# Patient Record
Sex: MALE | Race: WHITE | NOT HISPANIC OR LATINO | Employment: OTHER | URBAN - METROPOLITAN AREA
[De-identification: names, ages, dates, MRNs, and addresses within clinical notes are randomized per-mention and may not be internally consistent; named-entity substitution may affect disease eponyms.]

---

## 2022-11-28 ENCOUNTER — TELEPHONE (OUTPATIENT)
Dept: NEUROLOGY | Facility: CLINIC | Age: 77
End: 2022-11-28
Payer: MEDICARE

## 2022-12-01 ENCOUNTER — OFFICE VISIT (OUTPATIENT)
Dept: NEUROLOGY | Facility: CLINIC | Age: 77
End: 2022-12-01
Payer: MEDICARE

## 2022-12-01 ENCOUNTER — TELEPHONE (OUTPATIENT)
Dept: NEUROLOGY | Facility: CLINIC | Age: 77
End: 2022-12-01
Payer: MEDICARE

## 2022-12-01 VITALS
HEART RATE: 55 BPM | WEIGHT: 171.75 LBS | DIASTOLIC BLOOD PRESSURE: 91 MMHG | SYSTOLIC BLOOD PRESSURE: 196 MMHG | RESPIRATION RATE: 18 BRPM

## 2022-12-01 DIAGNOSIS — Z86.73 HISTORY OF STROKE: ICD-10-CM

## 2022-12-01 DIAGNOSIS — E23.6 PITUITARY MASS: ICD-10-CM

## 2022-12-01 DIAGNOSIS — G56.03 BILATERAL CARPAL TUNNEL SYNDROME: ICD-10-CM

## 2022-12-01 DIAGNOSIS — G56.03 CARPAL TUNNEL SYNDROME, BILATERAL: Primary | ICD-10-CM

## 2022-12-01 DIAGNOSIS — I10 HYPERTENSION, UNSPECIFIED TYPE: ICD-10-CM

## 2022-12-01 DIAGNOSIS — E11.69 TYPE 2 DIABETES MELLITUS WITH OTHER SPECIFIED COMPLICATION, WITHOUT LONG-TERM CURRENT USE OF INSULIN: ICD-10-CM

## 2022-12-01 DIAGNOSIS — R29.818 OTHER SYMPTOMS AND SIGNS INVOLVING THE NERVOUS SYSTEM: Primary | ICD-10-CM

## 2022-12-01 DIAGNOSIS — R79.9 ABNORMAL FINDING OF BLOOD CHEMISTRY, UNSPECIFIED: ICD-10-CM

## 2022-12-01 PROCEDURE — 99213 OFFICE O/P EST LOW 20 MIN: CPT | Mod: PBBFAC,PO | Performed by: PSYCHIATRY & NEUROLOGY

## 2022-12-01 PROCEDURE — 99205 PR OFFICE/OUTPT VISIT, NEW, LEVL V, 60-74 MIN: ICD-10-PCS | Mod: S$PBB,,, | Performed by: PSYCHIATRY & NEUROLOGY

## 2022-12-01 PROCEDURE — 99205 OFFICE O/P NEW HI 60 MIN: CPT | Mod: S$PBB,,, | Performed by: PSYCHIATRY & NEUROLOGY

## 2022-12-01 PROCEDURE — 99999 PR PBB SHADOW E&M-EST. PATIENT-LVL III: CPT | Mod: PBBFAC,,, | Performed by: PSYCHIATRY & NEUROLOGY

## 2022-12-01 PROCEDURE — 99999 PR PBB SHADOW E&M-EST. PATIENT-LVL III: ICD-10-PCS | Mod: PBBFAC,,, | Performed by: PSYCHIATRY & NEUROLOGY

## 2022-12-01 RX ORDER — NAPROXEN SODIUM 220 MG
220 TABLET ORAL 2 TIMES DAILY WITH MEALS
COMMUNITY

## 2022-12-01 NOTE — PROGRESS NOTES
"Date of service:  12/01/2022     Chief complaint:  Possible TIA    History of present illness:  The patient is a 77 y.o. male who we have been asked to see for possible TIA. Their symptoms began about a months ago and lasted for "a couple of days."  The deficit was a right weakness.  It is characterized as drooping of the right side of the mouth and was moderate in intensity.  There are no clear exacerbating or relieving factors.  His voice also sounded like he was "drunk."      Of note, he was sent for an MRI of the brain.  He was told then that he could not have it done because of his aneurysm coil.  The coil was placed in 2007 in [facility].    Also of note, the patient was found to have an enlarged pituitary on CT head.  In speaking with him, his wife thinks he may have been told that the pituitary was enlarged in 2007 at the time of the aneurysm coiling.    The patient also complains of altered sensation in his hands.  This has been going on for about a year.  He notices decreased sensation and an odd sensation when he rubs the hands together.  There are no other exacerbating or relieving factors.  He does not report associated symptoms, and he denies similar sensations in the feet.      Finally, it is worth mentioning that several years ago the patient decided to stop seeing doctors and to stop taking medication.      Past Medical History:   Diagnosis Date    Aneurysm     Crohn's disease     Diabetes mellitus     Hypertension     Pituitary tumor        Past Surgical History:   Procedure Laterality Date    REPAIR OF ANEURYSM Left        Family History   Problem Relation Age of Onset    Cancer Sister        Social History     Socioeconomic History    Marital status:    Tobacco Use    Smoking status: Never    Smokeless tobacco: Current     Types: Chew   Substance and Sexual Activity    Alcohol use: Yes     Alcohol/week: 7.0 standard drinks     Types: 7 Cans of beer per week    Drug use: Never " "      Review of patient's allergies indicates:  No Known Allergies     Review of Systems  The patient's ROS is noncontributory except as noted above.     Physical exam:  BP (!) 196/91 (BP Location: Right arm, Patient Position: Sitting, BP Method: Medium (Automatic))   Pulse (!) 55   Resp 18   Wt 77.9 kg (171 lb 11.8 oz)   General: Well developed, well nourished.  No acute distress.  ENT: Mucus membranes moist.  Atraumatic external nose and ears.  Lymphatic: No apparent lymphadenopathy.  Cardiovascular: Regular rate and rhythm.  Pulmonary: No increased work of breathing.  Abdomen/GI: No guarding.  Musculoskeletal: No obvious joint deformities, moves all extremities well.    Neurological exam:  Mental status: Awake and alert.  Oriented x4.  Speech fluent and appropriate.  Recent and remote memory appear to be intact.  Fund of knowledge normal.  Cranial nerves: Pupils equal round and reactive to light, extraocular movements intact, facial strength and sensation intact bilaterally, palate and tongue midline, hearing grossly intact bilaterally.  Motor: 5 out of 5 strength throughout the upper and lower extremities bilaterally. Normal bulk and tone.  Sensation: Intact to light touch and temperature bilaterally.  + Tinel's bilaterally.  DTR: 2+ at the knees and biceps bilaterally.  Coordination: Finger-nose-finger testing intact bilaterally.  Gait: Normal gait.    Data base:  Notes from the [facility] ER were reviewed.  These indicate that the patient presented with complaints of dysarthria and facial droop that had resolved by the time of evaluation.    Labs:  Labs checked at [facility] in the ER in 10/22 included CMP with a creatinine=1.2.  TSH, prolactin, FSH, testosterone, ACTH, growth hormone, and cortisol were reported as normal.    CT head:  "1. 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 It also extends caudally into the sphenoid " "sinus.   2. There is a metallic aneurysm coil at the level of the anterior communicating artery.   3. No intraparenchymal hemorrhage or hydrocephalus. There is diffuse cortical atrophy and white matter disease."  I independently visualized the images of this study and interpreted them.  No acute intracranial process was appreciated.  Enlargement of the pituitary gland and prior aneurysm treatment are noted.    EKG:  "Sinus rhythm with 1st degree A-V block   Left axis deviation   Right bundle branch block   Abnormal ECG   No previous ECGs available"    Assessment and plan:  The patient is a 77 y.o. male who presents for evaluation for possible TIA.  Evaluation is ongoing as noted above, and I would recommend completing the stroke workup including CTA head/neck, TTE, and relevant serologies.  We will consider antiplatelet medication pending the CTA head given his history of aneurysm.  Consideration should be given to a statin.  He is to work with his PCP on stroke prevention, including BP management, lipid profile optimization, glycemic monitoring, diet, exercise, and so forth.     As noted above, the patient has a history of aneurysm S/P coiling.  CTA head will be useful in assessing for additional aneurysms.  I have explained to him that aneurysms can enlarge as a result of uncontrolled hypertension.  He is to work with a PCP on this.    Note is made of the pituitary mass.  Initial serologies are reassuring.  We will have him follow up with endocrinology for this.  I have also ordered visual field testing since it does appear to exert mass effect on the optic chiasm.    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  I have ordered wrist splints for him.  We will check EMG.    As noted above, the patient has not been following with a PCP.  I feel strongly that he needs to do so.  I have explained that he has diagnosed medical issues that I do not manage.  In particular, I am concerned that his DM and HTN are " likely inadequately controlled.  My nursing staff has made an appointment for his with a PCP in the next few days.  He is agreeable to going.  If he has any new medical issues in the meantime, he is to go to the ER.    We will plan on seeing the patient back in a few weeks.    A total of 63 minutes of total time spent on the encounter, which includes face to face time and non-face to face time preparing to see the patient (eg, review of tests), Obtaining and/or reviewing separately obtained history, documenting clinical information in the electronic or other health record, independently interpreting results (not separately reported)/communicating results to the patient/family/caregiver, and/or care coordination (not separately reported).

## 2022-12-05 ENCOUNTER — OFFICE VISIT (OUTPATIENT)
Dept: PRIMARY CARE CLINIC | Facility: CLINIC | Age: 77
End: 2022-12-05
Payer: MEDICARE

## 2022-12-05 VITALS
TEMPERATURE: 98 F | BODY MASS INDEX: 28.37 KG/M2 | DIASTOLIC BLOOD PRESSURE: 66 MMHG | HEART RATE: 71 BPM | WEIGHT: 170.31 LBS | OXYGEN SATURATION: 97 % | SYSTOLIC BLOOD PRESSURE: 138 MMHG | HEIGHT: 65 IN

## 2022-12-05 DIAGNOSIS — Z86.73 H/O: STROKE: ICD-10-CM

## 2022-12-05 DIAGNOSIS — E78.49 FAMILIAL HYPERLIPIDEMIA: ICD-10-CM

## 2022-12-05 DIAGNOSIS — I10 ESSENTIAL HYPERTENSION: ICD-10-CM

## 2022-12-05 DIAGNOSIS — Z00.00 ENCOUNTER FOR MEDICAL EXAMINATION TO ESTABLISH CARE: Primary | ICD-10-CM

## 2022-12-05 DIAGNOSIS — E11.40 TYPE 2 DIABETES MELLITUS WITH DIABETIC NEUROPATHY, WITHOUT LONG-TERM CURRENT USE OF INSULIN: ICD-10-CM

## 2022-12-05 PROCEDURE — 99205 OFFICE O/P NEW HI 60 MIN: CPT | Mod: S$GLB,,, | Performed by: FAMILY MEDICINE

## 2022-12-05 PROCEDURE — 99205 PR OFFICE/OUTPT VISIT, NEW, LEVL V, 60-74 MIN: ICD-10-PCS | Mod: S$GLB,,, | Performed by: FAMILY MEDICINE

## 2022-12-05 RX ORDER — METFORMIN HYDROCHLORIDE 500 MG/1
500 TABLET, EXTENDED RELEASE ORAL NIGHTLY
Qty: 90 TABLET | Refills: 3 | Status: SHIPPED | OUTPATIENT
Start: 2022-12-05 | End: 2023-12-05

## 2022-12-05 RX ORDER — ATORVASTATIN CALCIUM 40 MG/1
40 TABLET, FILM COATED ORAL DAILY
Qty: 90 TABLET | Refills: 3 | Status: SHIPPED | OUTPATIENT
Start: 2022-12-05 | End: 2023-12-05

## 2022-12-05 RX ORDER — LISINOPRIL 5 MG/1
5 TABLET ORAL DAILY
Qty: 90 TABLET | Refills: 3 | Status: SHIPPED | OUTPATIENT
Start: 2022-12-05 | End: 2023-12-05

## 2022-12-05 NOTE — PROGRESS NOTES
Subjective:       Patient ID: Mari Sung is a 77 y.o. male.    Chief Complaint: Establish Care    77-year-old male presents to clinic to establish care.  Patient's wife is with him at bedside today.  Patient states he has not been seen by primary care provider in approximately 5 years, 5 years ago he stopped taking all medications and seeing all physicians.  Patient states he has a past medical history of Crohn's disease, a brain aneurysm for which a coil was placed approximately 2007 in [facility], diabetes, hypertension, hyperlipidemia, arthritis, pituitary tumor, stroke/TIA in 2007 and per record review maybe recently.  Patient states he had surgery for his Crohn disease, the aforementioned aneurysm, and a hemorrhoidectomy.  Patient does not know any medical history of his family.  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    Today, patient complains of numbness and tingling in his bilateral hands which he has seen Neurology about was given wrist splints to wear for carpal tunnel.  Advised patient to wear these consistently and notify us if symptoms worsen.  Discussed with patient and his wife abnormalities in speech, they state his speech has changed a little bit.  Patient states that he also has difficulty finding words.  Patient states that he is not active on a daily basis, he just sits and watches TV at home.  Patient states that he does not interact much with other people, does not have any hobbies, does not read much or do puzzles.  Encouraged increasing activity to stimulate his mind.    Patient was recently seen by neurologist, he will undergo EMG in January as well as CT head and neck Thursday of this week.  They have also referred him to Endocrinology.    Patient declined all  "vaccinations today.      Current Outpatient Medications:     naproxen sodium (ANAPROX) 220 MG tablet, Take 220 mg by mouth 2 (two) times daily with meals., Disp: , Rfl:     atorvastatin (LIPITOR) 40 MG tablet, Take 1 tablet (40 mg total) by mouth once daily., Disp: 90 tablet, Rfl: 3    lisinopriL (PRINIVIL,ZESTRIL) 5 MG tablet, Take 1 tablet (5 mg total) by mouth once daily., Disp: 90 tablet, Rfl: 3    metFORMIN (GLUCOPHAGE-XR) 500 MG ER 24hr tablet, Take 1 tablet (500 mg total) by mouth every evening., Disp: 90 tablet, Rfl: 3    Review of patient's allergies indicates:  No Known Allergies    Past Medical History:   Diagnosis Date    Aneurysm     Arthritis     Crohn's disease     Diabetes mellitus     Hypertension     Pituitary tumor     Stroke        Past Surgical History:   Procedure Laterality Date    REPAIR OF ANEURYSM Left     SMALL INTESTINE SURGERY         Family History   Problem Relation Age of Onset    Cancer Sister        Social History     Tobacco Use    Smoking status: Never     Passive exposure: Never    Smokeless tobacco: Current     Types: Chew   Substance Use Topics    Alcohol use: Yes     Alcohol/week: 7.0 standard drinks     Types: 7 Cans of beer per week    Drug use: Never       Review of Systems      Current Medications:   Home Psychiatric Meds:   Psychotherapeutics (From admission, onward)      None                Objective:      Vitals:    12/05/22 0943   BP: 138/66   Pulse: 71   Temp: 98.1 °F (36.7 °C)   TempSrc: Oral   SpO2: 97%   Weight: 77.2 kg (170 lb 4.8 oz)   Height: 5' 5" (1.651 m)     Physical Exam      Lab Results   Component Value Date    WBC 7.8 10/12/2022    HGB 13.2 10/12/2022    HCT 38.4 10/12/2022     10/12/2022    CHOL 260 (H) 12/02/2022    TRIG 177 (H) 12/02/2022    HDL 45 12/02/2022    ALT 10 10/12/2022    AST 33 10/12/2022     10/12/2022    K 4.7 10/12/2022     10/12/2022    CREATININE 1.19 10/12/2022    BUN 13 10/12/2022    CO2 27 10/12/2022    TSH 3.33 " 11/23/2022    HGBA1C 6.7 (H) 12/02/2022      Assessment:       1. Encounter for medical examination to establish care    2. Type 2 diabetes mellitus with diabetic neuropathy, without long-term current use of insulin    3. Essential hypertension    4. Familial hyperlipidemia    5. H/O: stroke        Plan:         Problem List Items Addressed This Visit          Neuro    H/O: stroke     - speech therapy was offered to patient today for improvement in speech pattern, patient declined  - encouraged patient to increase social interaction to help improve his speech patterns  - given patient's history of stroke, TIA go ahead and start a statin today patient's age has been taking into consideration         Relevant Medications    atorvastatin (LIPITOR) 40 MG tablet       Cardiac/Vascular    Essential hypertension    Relevant Medications    atorvastatin (LIPITOR) 40 MG tablet    lisinopriL (PRINIVIL,ZESTRIL) 5 MG tablet    Familial hyperlipidemia    Relevant Medications    atorvastatin (LIPITOR) 40 MG tablet       Endocrine    Type 2 diabetes mellitus with diabetic neuropathy, without long-term current use of insulin    Relevant Medications    atorvastatin (LIPITOR) 40 MG tablet    lisinopriL (PRINIVIL,ZESTRIL) 5 MG tablet    metFORMIN (GLUCOPHAGE-XR) 500 MG ER 24hr tablet       Other    Encounter for medical examination to establish care - Primary     - concern for extensive history of noncompliance  - advised patient to participate in activities that will stimulate his mind and verbal abilities including puzzles, interacting with others, joining a senior group, patient says he will think about doing these  - will obtain outside records for complete understanding of past medical history              Follow up today (on 12/5/2022), or if symptoms worsen or fail to improve.  Obtain labs at next appointment.         Approximately 60 minutes were spent on this encounter. This includes face to face time and non-face to face time  preparing to see the patient (eg, review of tests), obtaining and/or reviewing separately obtained history, documenting clinical information in the electronic or other health record, independently interpreting results and communicating results to the patient/family/caregiver, or care coordinator.    Instructed patient that if symptoms fail to improve or worsen patient should seek immediate medical attention or report to the nearest emergency department. Patient expressed verbal agreement and understanding to this plan of care.     This note was created using [employer] voice recognition software that occasionally misinterpreted phrases or words.     MINNIE Corral MD

## 2022-12-06 PROBLEM — E78.49 FAMILIAL HYPERLIPIDEMIA: Status: ACTIVE | Noted: 2022-12-06

## 2022-12-06 PROBLEM — Z86.73 H/O: STROKE: Status: ACTIVE | Noted: 2022-12-06

## 2022-12-06 PROBLEM — I10 ESSENTIAL HYPERTENSION: Status: ACTIVE | Noted: 2022-12-06

## 2022-12-06 PROBLEM — Z00.00 ENCOUNTER FOR MEDICAL EXAMINATION TO ESTABLISH CARE: Status: ACTIVE | Noted: 2022-12-06

## 2022-12-06 PROBLEM — E11.40 TYPE 2 DIABETES MELLITUS WITH DIABETIC NEUROPATHY, WITHOUT LONG-TERM CURRENT USE OF INSULIN: Status: ACTIVE | Noted: 2022-12-06

## 2022-12-06 NOTE — ASSESSMENT & PLAN NOTE
- speech therapy was offered to patient today for improvement in speech pattern, patient declined  - encouraged patient to increase social interaction to help improve his speech patterns  - given patient's history of stroke, TIA go ahead and start a statin today patient's age has been taking into consideration

## 2022-12-06 NOTE — ASSESSMENT & PLAN NOTE
- concern for extensive history of noncompliance  - advised patient to participate in activities that will stimulate his mind and verbal abilities including puzzles, interacting with others, joining a senior group, patient says he will think about doing these  - will obtain outside records for complete understanding of past medical history

## 2022-12-08 ENCOUNTER — PATIENT OUTREACH (OUTPATIENT)
Dept: ADMINISTRATIVE | Facility: HOSPITAL | Age: 77
End: 2022-12-08
Payer: MEDICARE

## 2022-12-08 ENCOUNTER — HOSPITAL ENCOUNTER (OUTPATIENT)
Dept: RADIOLOGY | Facility: HOSPITAL | Age: 77
Discharge: HOME | End: 2022-12-08
Attending: PSYCHIATRY & NEUROLOGY
Payer: MEDICARE

## 2022-12-08 DIAGNOSIS — Z86.73 HISTORY OF STROKE: ICD-10-CM

## 2022-12-08 DIAGNOSIS — R29.818 OTHER SYMPTOMS AND SIGNS INVOLVING THE NERVOUS SYSTEM: ICD-10-CM

## 2022-12-08 PROCEDURE — 25500020 PHARM REV CODE 255: Performed by: PSYCHIATRY & NEUROLOGY

## 2022-12-08 PROCEDURE — 70496 CT ANGIOGRAPHY HEAD: CPT | Mod: TC

## 2022-12-08 PROCEDURE — 70498 CT ANGIOGRAPHY NECK: CPT | Mod: 26,,, | Performed by: RADIOLOGY

## 2022-12-08 PROCEDURE — 70496 CT ANGIOGRAPHY HEAD: CPT | Mod: 26,,, | Performed by: RADIOLOGY

## 2022-12-08 PROCEDURE — 70496 CTA HEAD AND NECK (XPD): ICD-10-PCS | Mod: 26,,, | Performed by: RADIOLOGY

## 2022-12-08 PROCEDURE — 70498 CTA HEAD AND NECK (XPD): ICD-10-PCS | Mod: 26,,, | Performed by: RADIOLOGY

## 2022-12-08 RX ADMIN — IOHEXOL 75 ML: 350 INJECTION, SOLUTION INTRAVENOUS at 12:12

## 2022-12-27 ENCOUNTER — TELEPHONE (OUTPATIENT)
Dept: PRIMARY CARE CLINIC | Facility: CLINIC | Age: 77
End: 2022-12-27
Payer: MEDICARE

## 2022-12-27 NOTE — TELEPHONE ENCOUNTER
Called to reschedule  patient on @@@@ with Dr. @@@.  will be out the clinic and we need to reschedule for a different day or come see her in [place] on Mondays. A voicemail was left explaining this.

## 2023-01-02 ENCOUNTER — HOSPITAL ENCOUNTER (OUTPATIENT)
Dept: TELEMEDICINE | Facility: HOSPITAL | Age: 78
Discharge: HOME | End: 2023-01-02
Payer: MEDICARE

## 2023-01-02 DIAGNOSIS — G45.9 TIA (TRANSIENT ISCHEMIC ATTACK): ICD-10-CM

## 2023-01-02 PROCEDURE — G0426 PR INPT TELEHEALTH CONSULT 50M: ICD-10-PCS | Mod: 95,,, | Performed by: PSYCHIATRY & NEUROLOGY

## 2023-01-02 PROCEDURE — G0426 INPT/ED TELECONSULT50: HCPCS | Mod: 95,,, | Performed by: PSYCHIATRY & NEUROLOGY

## 2023-01-03 NOTE — ASSESSMENT & PLAN NOTE
Transient facial droop and dysarthria  Antithrombotics for secondary stroke prevention: Antiplatelets: Aspirin: 81 mg daily  Clopidogrel: 75 mg daily    Statins for secondary stroke prevention and hyperlipidemia, if present:   Statins: Atorvastatin- 80 mg daily    Aggressive risk factor modification: HTN, DM     Rehab efforts: The patient has been evaluated by a stroke team provider and the therapy needs have been fully considered based off the presenting complaints and exam findings. The following therapy evaluations are needed: PT evaluate and treat, OT evaluate and treat, SLP evaluate and treat    Diagnostics ordered/pending: Carotid ultrasound to assess vasculature, HgbA1C to assess blood glucose levels, Lipid Profile to assess cholesterol levels, MRA head to assess vasculature, MRI head without contrast to assess brain parenchyma, TTE to assess cardiac function/status , TSH to assess thyroid function    VTE prophylaxis: Enoxaparin 40 mg SQ every 24 hours    BP parameters: TIA: SBP <160mmhg

## 2023-01-03 NOTE — CONSULTS
[facility]  Vascular Neurology  Comprehensive Stroke Center  TeleVascular Neurology Acute Consultation Note      Consults    Consulting Provider: JOSÉ ANTONIO GRADY  Current Providers  No providers found    Patient Location: [facility] Department  Spoke hospital nurse at bedside with patient assisting consultant.     Patient information was obtained from primary team.         Assessment/Plan:       Diagnoses:   TIA (transient ischemic attack)  Transient facial droop and dysarthria  Antithrombotics for secondary stroke prevention: Antiplatelets: Aspirin: 81 mg daily  Clopidogrel: 75 mg daily    Statins for secondary stroke prevention and hyperlipidemia, if present:   Statins: Atorvastatin- 80 mg daily    Aggressive risk factor modification: HTN, DM     Rehab efforts: The patient has been evaluated by a stroke team provider and the therapy needs have been fully considered based off the presenting complaints and exam findings. The following therapy evaluations are needed: PT evaluate and treat, OT evaluate and treat, SLP evaluate and treat    Diagnostics ordered/pending: Carotid ultrasound to assess vasculature, HgbA1C to assess blood glucose levels, Lipid Profile to assess cholesterol levels, MRA head to assess vasculature, MRI head without contrast to assess brain parenchyma, TTE to assess cardiac function/status , TSH to assess thyroid function    VTE prophylaxis: Enoxaparin 40 mg SQ every 24 hours    BP parameters: TIA: SBP <160mmhg            STROKE DOCUMENTATION     Acute Stroke Times:   Acute Stroke Times   Last Known Normal Date: 01/02/23  Last Known Normal Time: 1730  Stroke Team Arrival Date: 01/02/23 (Spoke site telecart offline)  Stroke Team Arrival Time: 1757    NIH Scale:        Modified Yonatan    [place] Coma Scale:    ABCD2 Score: 4  [id number] Score:   HAS -BLED Score:   ICH Score:   Hunt &  Meeks Classification:       There were no vitals taken for this visit.  Eligible for thrombolytic therapy?: No  Thrombolytic therapy recomended: Thrombolytic therapy not recommended due to Patient back to neurological baseline  Possible Interventional Revascularization Candidate? No; at this time symptoms not suggestive of large vessel occlusion    Disposition Recommendation: admit to inpatient    Subjective:     History of Present Illness:  Sudden onset of dysarthria and left facial nerve asymmetry. Now back to normal      Woke up with symptoms?: no    Recent bleeding noted: no  Does the patient take any Blood Thinners? no  Medications: No relevant medications      Past Medical History: hypertension, diabetes, hyperlipidemia and stroke    Past Surgical History: no relevant surgical history    Family History: no relevant history    Social History: no smoking, no drinking, no drugs    Allergies: No Known Allergies     Review of Systems  Objective:   Vitals:  BP: 119/81, Respiratory Rate: 16 and Heart Rate: 62    CT READ: ED provider to document report in chart. Images not available on PACS.    Physical Exam        Recommended the emergency room physician to have a brief discussion with the patient and/or family if available regarding the  risks and benefits of treatment, and to briefly document the occurrence of that discussion in his clinical encounter note.     The attending portion of this evaluation, treatment, and documentation was performed per Dieter Vogel MD via audiovisual.    Billing code:  (NM TELHEATH INPT CONSULT 25MIN)    In your opinion, this was a: Tier 1 Van Negative    Consult End Time: 6:16 PM     Dieter Vogel MD  Comprehensive Stroke Center  Vascular Neurology   [facility]

## 2023-01-03 NOTE — SUBJECTIVE & OBJECTIVE
Woke up with symptoms?: no    Recent bleeding noted: no  Does the patient take any Blood Thinners? no  Medications: No relevant medications      Past Medical History: hypertension, diabetes, hyperlipidemia and stroke    Past Surgical History: no relevant surgical history    Family History: no relevant history    Social History: no smoking, no drinking, no drugs    Allergies: No Known Allergies     Review of Systems  Objective:   Vitals:  BP: 119/81, Respiratory Rate: 16 and Heart Rate: 62    CT READ: ED provider to document report in chart. Images not available on PACS.    Physical Exam

## 2023-01-09 ENCOUNTER — OFFICE VISIT (OUTPATIENT)
Dept: PRIMARY CARE CLINIC | Facility: CLINIC | Age: 78
End: 2023-01-09
Payer: MEDICARE

## 2023-01-09 VITALS
SYSTOLIC BLOOD PRESSURE: 130 MMHG | DIASTOLIC BLOOD PRESSURE: 68 MMHG | OXYGEN SATURATION: 97 % | WEIGHT: 165.81 LBS | BODY MASS INDEX: 27.59 KG/M2 | HEART RATE: 54 BPM

## 2023-01-09 DIAGNOSIS — E11.65 INADEQUATELY CONTROLLED DIABETES MELLITUS: Primary | ICD-10-CM

## 2023-01-09 DIAGNOSIS — I10 ESSENTIAL HYPERTENSION, BENIGN: ICD-10-CM

## 2023-01-09 DIAGNOSIS — G45.9 TIA (TRANSIENT ISCHEMIC ATTACK): ICD-10-CM

## 2023-01-09 DIAGNOSIS — N18.32 STAGE 3B CHRONIC KIDNEY DISEASE: ICD-10-CM

## 2023-01-09 DIAGNOSIS — E78.2 MIXED HYPERLIPIDEMIA: ICD-10-CM

## 2023-01-09 PROBLEM — N18.30 CHRONIC KIDNEY DISEASE, STAGE III (MODERATE): Status: ACTIVE | Noted: 2023-01-09

## 2023-01-09 PROCEDURE — 99215 OFFICE O/P EST HI 40 MIN: CPT | Mod: S$GLB,,, | Performed by: INTERNAL MEDICINE

## 2023-01-09 PROCEDURE — 99215 PR OFFICE/OUTPT VISIT, EST, LEVL V, 40-54 MIN: ICD-10-PCS | Mod: S$GLB,,, | Performed by: INTERNAL MEDICINE

## 2023-01-09 NOTE — ASSESSMENT & PLAN NOTE
Monitor closely..The patient is advised to reduce or avoid  NSAIDs , cont ACE inh , t/c renal U/S , ref to Dr Hairston,...  Inc fluids intake

## 2023-01-10 ENCOUNTER — PROCEDURE VISIT (OUTPATIENT)
Dept: NEUROLOGY | Facility: CLINIC | Age: 78
End: 2023-01-10
Payer: MEDICARE

## 2023-01-10 DIAGNOSIS — G56.03 BILATERAL CARPAL TUNNEL SYNDROME: ICD-10-CM

## 2023-01-10 PROCEDURE — 95912 NRV CNDJ TEST 11-12 STUDIES: CPT | Mod: PBBFAC,PO | Performed by: PSYCHIATRY & NEUROLOGY

## 2023-01-10 PROCEDURE — 95886 MUSC TEST DONE W/N TEST COMP: CPT | Mod: 26,S$PBB,, | Performed by: PSYCHIATRY & NEUROLOGY

## 2023-01-10 PROCEDURE — 95886 MUSC TEST DONE W/N TEST COMP: CPT | Mod: PBBFAC,PO | Performed by: PSYCHIATRY & NEUROLOGY

## 2023-01-10 PROCEDURE — 95886 PR EMG COMPLETE, W/ NERVE CONDUCTION STUDIES, 5+ MUSCLES: ICD-10-PCS | Mod: 26,S$PBB,, | Performed by: PSYCHIATRY & NEUROLOGY

## 2023-01-10 PROCEDURE — 95912 NRV CNDJ TEST 11-12 STUDIES: CPT | Mod: 26,S$PBB,, | Performed by: PSYCHIATRY & NEUROLOGY

## 2023-01-10 PROCEDURE — 95912 PR NERVE CONDUCTION STUDY; 11 -12 STUDIES: ICD-10-PCS | Mod: 26,S$PBB,, | Performed by: PSYCHIATRY & NEUROLOGY

## 2023-01-10 NOTE — PROCEDURES
[facility]  [facility]  [address] [facility] [address]  [place] [address]  [phone]      Full Name: Mari Sung Gender: Male  Patient ID: [id number] YOB: 1945      Visit Date: 1/10/2023 1:12 PM  Age: 77 Years  Examining Physician: Carmencita Mahajan MD   Referring Physician: Reed العلي M.D.   Technologist: DANIEL Page   Height: 5 feet 5 inch  History: Diabetic patient complaining of bilateral hand numbness.       Sensory NCS      Nerve / Sites Rec. Site Onset Lat Peak Lat NP Amp Segments Distance Peak Diff Velocity Temp.     ms ms µV  cm ms m/s °C   L Median - Digit II (Antidromic)      Wrist Dig II 4.48 5.23 3.5 Wrist - Dig II 13  29 31.2      Ref.   ?3.80 ?10.0 Ref.   ?50    R Median - Digit II (Antidromic)      Wrist Dig II 5.38 7.77 5.8 Wrist - Dig II 13  24 31.2      Ref.   ?3.80 ?10.0 Ref.   ?50    L Median, Ulnar - Transcarpal comparison      Median Palm Wrist 3.40 4.25 12.7 Median Palm - Wrist 8  24 31.2      Ref.   ?2.20  Ref.          Ulnar Palm Wrist 1.60 2.38 22.1 Ulnar Palm - Wrist 8  50 31.2      Ref.   ?2.20  Ref.            Median Palm - Ulnar Palm  1.88  31.2        Ref.  ?0.40     R Median, Ulnar - Transcarpal comparison      Median Palm Wrist NR NR NR Median Palm - Wrist 8  NR 31.2      Ref.   ?2.20  Ref.          Ulnar Palm Wrist 1.71 2.65 19.5 Ulnar Palm - Wrist 8  47 31.2      Ref.   ?2.20  Ref.            Median Palm - Ulnar Palm  NR  31.2        Ref.  ?0.40     L Ulnar - Digit V (Antidromic)      Wrist Dig V 2.21 2.81 7.8 Wrist - Dig V 11  50 31.2      Ref.   ?3.20 ?10.0 Ref.   ?50    R Ulnar - Digit V (Antidromic)      Wrist Dig V 2.96 3.85 9.5 Wrist - Dig V 11  37 31.2      Ref.   ?3.20 ?10.0 Ref.   ?50        Motor NCS      Nerve / Sites Muscle Latency Ref. Amplitude Ref. Amp % Duration Segments Distance Lat Diff Ref. Velocity Ref. Temp.     ms ms mV mV % ms  cm ms ms m/s m/s °C   L Median - APB      Wrist APB 6.13 ?4.00 6.7 ?5.0 100 7.15  Wrist - APB      31.2      Elbow APB 10.98  6.6  99.2 7.42 Elbow - Wrist 22.5 4.85  46 ?50 31.2   R Median - APB      Wrist APB 9.35 ?4.00 2.9 ?5.0 100 5.94 Wrist - APB      31.2      Elbow APB 14.17  3.2  110 6.29 Elbow - Wrist 23 4.81  48 ?50 31.2   L Ulnar - ADM      Wrist ADM 3.75 ?3.10 10.9 ?7.0 100 5.67 Wrist - ADM      31.2      B.Elbow ADM 7.23  9.6  87.9 5.75 B.Elbow - Wrist 18 3.48  52 ?50 31.2      A.Elbow ADM 9.58  8.6  78.8 6.71 A.Elbow - B.Elbow 10 2.35  42  31.2   R Ulnar - ADM      Wrist ADM 3.94 ?3.10 8.6 ?7.0 100 5.23 Wrist - ADM      31.2      B.Elbow ADM 7.85  8.0  93.3 5.52 B.Elbow - Wrist 19 3.92  49 ?50 31.2      A.Elbow ADM 10.10  7.4  86.2 6.27 A.Elbow - B.Elbow 10 2.25  44  31.2   L Median, Ulnar - Lumbrical-Interossei      Median Wrist Lumb II 6.40  0.2  100  Median Wrist - Lumb II 10     31.2      Ulnar Wrist Lumb II 4.13  1.6  656 6.52 Ulnar Wrist - Lumb II 10     31.2           Median Wrist - Ulnar Wrist  2.27 ?0.50   31.2   R Median, Ulnar - Lumbrical-Interossei      Median Wrist Lumb II 9.40  0.3  100 8.50 Median Wrist - Lumb II 10     31.2      Ulnar Wrist Lumb II 4.46  1.9  618 5.12 Ulnar Wrist - Lumb II 10     31.2           Median Wrist - Ulnar Wrist  4.94 ?0.50   31.2       F  Wave      Nerve Fmin Ref.    ms ms   L Median - APB 31.72 ?31.00   L Ulnar - ADM 33.02 ?32.00   R Median - APB 36.04 ?31.00   R Ulnar - ADM 35.10 ?32.00       EMG Summary Table     Spontaneous Recruitment Activation Duration Amplitude Polyphasia Comment   Muscle Ins Act Fib Fasc Pattern - - - - -   R. Deltoid (posterior) Normal 0 0 Mod Dec Normal +1 +1 Normal Normal   R. Biceps brachii Normal 0 0 Mod Dec Normal +1 +1 Normal Normal   R. Triceps brachii Normal 0 0 Normal Normal Normal Normal Normal Normal   R. Pronator teres Normal 0 0 Normal Normal Normal Normal Normal Normal   R. First dorsal interosseous Normal 0 0 Sl Dec Normal Normal Normal Normal Normal   R. Opponens pollicis Normal 0 0 Sl Dec Normal  Normal Normal Normal Normal   R. Extensor indicis proprius Normal 0 0 Sl Dec Normal Normal Normal Normal Normal   L. Deltoid (posterior) Normal 0 0 Normal Normal Normal Normal Normal Normal   L. Triceps brachii Normal 0 0 Sl Dec Normal Normal Normal Normal Normal   L. Pronator teres Normal 0 0 Sl Dec Normal Normal Normal Normal Normal   L. First dorsal interosseous Normal 0 0 Sl Dec Normal Normal Normal Normal Normal   L. Opponens pollicis Normal 0 0 Mk Dec Normal +2 +2 Normal Normal   L. Extensor indicis proprius Normal 0 0 Sl Dec Normal +1 Normal +1 Normal         Summary: Left upper extremity nerve conduction studies show prolonged latency, low amplitude and slowed conduction velocity of the median sensory response. There is low amplitude of the ulnar sensory response. There is relative prolongation of the median sensory latency as compared to the ulnar sensory latency. Left median motor response has prolonged latency and slowed conduction velocity. The left ulnar motor has prolonged latency. There is relative prolongation of the median motor latency as compared to the ulnar motor latency.     Left upper extremity needle examination shows mild chronic neurogenic change sin the C6/7 innervated muscles and in C8/T1 innervated muscles with more prominent neurogenic changes in the opponens pollicis.     Right upper extremity nerve conduction studies show prolonged latency, low amplitude and slowed conduction velocity of the median and ulnar sensory responses. On transcarpal sensory comparison, the median response is absent. Right median motor response has prolonged latency and low amplitude response with mild slowing of conduction velocity. There is relative prolongation of the median motor latency as compared to the ulnar motor latency.     Right upper extremity needle examination shows chronic neurogenic changes in the C5 innervated muscles and C8/T1 innervated muscles. No fibrillation potentials were seen.      Impression: Complex EMG. 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       Thank you for referring to the [facility] in [place]. Please feel free to contact the clinic if you have any further questions regarding this study or report.      Carmencita Mahajan MD

## 2023-01-10 NOTE — PROCEDURES
After patient's EMG appointment, he stood up to leave and had sudden onset of dysarthria and right lower facial droop. A chair was brought into the hallway and he sat down. No pronator drift was present. Patient was coherent and able to communicate throughout the event (though dysarthric). His BP was 185/100 and glucose was 67. After being seated he rapidly began improving and facial droop and dysarthria improved in 5-10 minutes. EMTs were called and patient refused transport to hospital. I instructed his wife to bring him to [facility] for evaluation given his complex vascular history and multiple episodes concerning for possible TIAs.

## 2023-01-11 ENCOUNTER — TELEPHONE (OUTPATIENT)
Dept: NEUROLOGY | Facility: CLINIC | Age: 78
End: 2023-01-11
Payer: MEDICARE

## 2023-01-11 NOTE — TELEPHONE ENCOUNTER
Left message for the pt to call.  Need to reschedule the pt's appt on 3/2/23.  Dr. العلي will be out of the office that day.

## 2023-01-11 NOTE — TELEPHONE ENCOUNTER
----- Message from Reed العلي Jr., MD sent at 1/11/2023  8:55 AM CST -----  There are several findings on the EMG.  Most of it looks to be old.  There is, though, significant carpal tunnel syndrome.  This is what is primarily responsible for his hand symptoms.  On EMG testing, it looks pretty severe.  We discussed wearing wrist splints on the hands.  Find out how that is going.  If in the coming weeks he does not see improvement in the symptoms or if he is noticing weakness or loss of muscular bulk in the hands, he should see a hand surgeon.    ----- Message -----  From: Carmencita Mahajan MD  Sent: 1/10/2023   4:01 PM CST  To: Reed العلي Jr., MD

## 2023-01-17 ENCOUNTER — TELEPHONE (OUTPATIENT)
Dept: NEUROLOGY | Facility: CLINIC | Age: 78
End: 2023-01-17
Payer: MEDICARE

## 2023-01-17 NOTE — TELEPHONE ENCOUNTER
Labs from [facility] received. Copy placed on Dr. العلي's desk for review. Original sent to scanning.

## 2023-03-13 PROBLEM — Z00.00 ENCOUNTER FOR MEDICAL EXAMINATION TO ESTABLISH CARE: Status: RESOLVED | Noted: 2022-12-06 | Resolved: 2023-03-13

## 2023-03-20 NOTE — PROGRESS NOTES
Subjective:       Patient ID: Mari Sung is a 77 y.o. male.    Chief Complaint: Follow up TIA      Hyperlipidemia  This is a chronic problem. Episode onset: Rx started again last month. The problem is uncontrolled. Recent lipid tests were reviewed and are high (pending repeat labs). Exacerbating diseases include chronic renal disease and obesity. Associated symptoms include a focal weakness. Pertinent negatives include no chest pain or shortness of breath. Current antihyperlipidemic treatment includes statins. Improvement on treatment: labs pending. Risk factors for coronary artery disease include dyslipidemia, male sex and obesity.   Review of Systems   Constitutional:  Negative for activity change.   Respiratory:  Negative for shortness of breath.    Cardiovascular:  Negative for chest pain.   Neurological:  Positive for focal weakness, facial asymmetry and weakness.       Objective:      Physical Exam  Vitals (ill looking elderly white male using a cane with focal weakness , facial asymmetry) and nursing note reviewed.   Cardiovascular:      Rate and Rhythm: Normal rate and regular rhythm.   Pulmonary:      Effort: Pulmonary effort is normal.      Breath sounds: Normal breath sounds.       Assessment:       1. Mixed hyperlipidemia  Overview:  Started on lipitor last month    Assessment & Plan:  I'll add an order for a lipid panel , LFTs and f/u with Dr Mayfield    2. CKD , stage 3B   3. HTN  4. H/o CVA  5. Recurrent TIAs  6. S/p clipping of aneurysm  7. Pituitary adenoma               Plan:       1. Mixed hyperlipidemia  Overview:  Started on lipitor last month    Assessment & Plan:  I'll add an order for a lipid panel , LFTs and f/u with Dr Mayfield    2. Recheck lipids, LFTs  3. Monitor renal fx closely, t/c renal U/S , 24 Hrs urine, microalbumin, Nehrology referral  4. Inc water intake, avoid NSAIDs  5. Monitor BP  6. PT/OT if still needed  7. F/u with neurology  8. T/c ref to N/S , endo re pituitary  adenoma                 0 (no pain/absence of nonverbal indicators of pain)

## 2023-04-10 PROBLEM — G45.9 TIA (TRANSIENT ISCHEMIC ATTACK): Status: RESOLVED | Noted: 2023-01-02 | Resolved: 2023-04-10

## 2023-09-07 ENCOUNTER — TELEPHONE (OUTPATIENT)
Dept: FAMILY MEDICINE | Facility: CLINIC | Age: 78
End: 2023-09-07
Payer: MEDICARE

## 2023-09-07 NOTE — TELEPHONE ENCOUNTER
----- Message from Tiffanie Copeland sent at 9/6/2023  1:44 PM CDT -----  Regarding: Appt  Contact: [phone]  Patient Mari is calling. Patient would like to schedule an appt. Patient was seeing Dr Corral . Please call patient back at  [phone]

## 2023-09-11 ENCOUNTER — TELEPHONE (OUTPATIENT)
Dept: FAMILY MEDICINE | Facility: CLINIC | Age: 78
End: 2023-09-11
Payer: MEDICARE

## 2023-09-11 NOTE — TELEPHONE ENCOUNTER
Duplicate message.  ----- Message from Mira Durham sent at 9/8/2023 12:00 PM CDT -----  Type:  est care / refill    Who Called:wife   Does the patient know what this is regarding?:would like to esr care  and needs a refill  Would the patient rather a call back or a response via [facility]?  Call   Best Call Back Number: [phone]  Additional Information:     Books were closed

## 2023-09-11 NOTE — TELEPHONE ENCOUNTER
----- Message from Jaleesa Chun sent at 9/7/2023  4:54 PM CDT -----  Contact: pt [phone]  Type:  Patient Returning Call    Who Called:  Pt   Who Left Message for Patient:  Casey   Does the patient know what this is regarding?:  Appt   Best Call Back Number:  [phone]    Additional Information:  Pls call back and advise